# Patient Record
Sex: FEMALE | NOT HISPANIC OR LATINO | Employment: UNEMPLOYED
[De-identification: names, ages, dates, MRNs, and addresses within clinical notes are randomized per-mention and may not be internally consistent; named-entity substitution may affect disease eponyms.]

---
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
## 2023-07-14 ENCOUNTER — HOSPITAL ENCOUNTER (EMERGENCY)
Facility: CLINIC | Age: 23
Discharge: HOME | End: 2023-07-14
Attending: EMERGENCY MEDICINE | Admitting: EMERGENCY MEDICINE
Payer: COMMERCIAL

## 2023-07-14 VITALS
DIASTOLIC BLOOD PRESSURE: 74 MMHG | BODY MASS INDEX: 30.12 KG/M2 | RESPIRATION RATE: 16 BRPM | TEMPERATURE: 97.4 F | HEIGHT: 63 IN | WEIGHT: 170 LBS | OXYGEN SATURATION: 100 % | HEART RATE: 62 BPM | SYSTOLIC BLOOD PRESSURE: 128 MMHG

## 2023-07-14 DIAGNOSIS — O21.0 HYPEREMESIS GRAVIDARUM: ICD-10-CM

## 2023-07-14 LAB
ABO/RH(D): NORMAL
ALBUMIN SERPL-MCNC: 4.4 G/DL (ref 3.5–5)
ALBUMIN UR-MCNC: 50 MG/DL
ALP SERPL-CCNC: 38 U/L (ref 45–120)
ALT SERPL W P-5'-P-CCNC: 13 U/L (ref 0–45)
ANION GAP SERPL CALCULATED.3IONS-SCNC: 11 MMOL/L (ref 5–18)
APPEARANCE UR: CLEAR
AST SERPL W P-5'-P-CCNC: 14 U/L (ref 0–40)
BACTERIA #/AREA URNS HPF: ABNORMAL /HPF
BASOPHILS # BLD AUTO: 0 10E3/UL (ref 0–0.2)
BASOPHILS NFR BLD AUTO: 0 %
BILIRUB SERPL-MCNC: 0.6 MG/DL (ref 0–1)
BILIRUB UR QL STRIP: NEGATIVE
BUN SERPL-MCNC: 5 MG/DL (ref 8–22)
CALCIUM SERPL-MCNC: 9 MG/DL (ref 8.5–10.5)
CHLORIDE BLD-SCNC: 104 MMOL/L (ref 98–107)
CO2 SERPL-SCNC: 22 MMOL/L (ref 22–31)
COLOR UR AUTO: ABNORMAL
CREAT SERPL-MCNC: 0.6 MG/DL (ref 0.6–1.1)
EOSINOPHIL # BLD AUTO: 0 10E3/UL (ref 0–0.7)
EOSINOPHIL NFR BLD AUTO: 0 %
ERYTHROCYTE [DISTWIDTH] IN BLOOD BY AUTOMATED COUNT: 12.9 % (ref 10–15)
GFR SERPL CREATININE-BSD FRML MDRD: >90 ML/MIN/1.73M2
GLUCOSE BLD-MCNC: 135 MG/DL (ref 70–125)
GLUCOSE UR STRIP-MCNC: 500 MG/DL
HCG SERPL-ACNC: ABNORMAL MLU/ML (ref 0–4)
HCT VFR BLD AUTO: 35.5 % (ref 35–47)
HGB BLD-MCNC: 12.3 G/DL (ref 11.7–15.7)
HGB UR QL STRIP: NEGATIVE
HOLD SPECIMEN: NORMAL
HOLD SPECIMEN: NORMAL
IMM GRANULOCYTES # BLD: 0.1 10E3/UL
IMM GRANULOCYTES NFR BLD: 1 %
KETONES UR STRIP-MCNC: >150 MG/DL
LEUKOCYTE ESTERASE UR QL STRIP: NEGATIVE
LIPASE SERPL-CCNC: 13 U/L (ref 0–52)
LYMPHOCYTES # BLD AUTO: 1.2 10E3/UL (ref 0.8–5.3)
LYMPHOCYTES NFR BLD AUTO: 12 %
MCH RBC QN AUTO: 28.6 PG (ref 26.5–33)
MCHC RBC AUTO-ENTMCNC: 34.6 G/DL (ref 31.5–36.5)
MCV RBC AUTO: 83 FL (ref 78–100)
MONOCYTES # BLD AUTO: 0.3 10E3/UL (ref 0–1.3)
MONOCYTES NFR BLD AUTO: 3 %
MUCOUS THREADS #/AREA URNS LPF: PRESENT /LPF
NEUTROPHILS # BLD AUTO: 8.5 10E3/UL (ref 1.6–8.3)
NEUTROPHILS NFR BLD AUTO: 84 %
NITRATE UR QL: NEGATIVE
NRBC # BLD AUTO: 0 10E3/UL
NRBC BLD AUTO-RTO: 0 /100
PH UR STRIP: 7 [PH] (ref 5–7)
PLATELET # BLD AUTO: 258 10E3/UL (ref 150–450)
POTASSIUM BLD-SCNC: 2.9 MMOL/L (ref 3.5–5)
PROT SERPL-MCNC: 7.2 G/DL (ref 6–8)
RBC # BLD AUTO: 4.3 10E6/UL (ref 3.8–5.2)
RBC URINE: <1 /HPF
SODIUM SERPL-SCNC: 137 MMOL/L (ref 136–145)
SP GR UR STRIP: 1.03 (ref 1–1.03)
SPECIMEN EXPIRATION DATE: NORMAL
SQUAMOUS EPITHELIAL: 2 /HPF
UROBILINOGEN UR STRIP-MCNC: <2 MG/DL
WBC # BLD AUTO: 10 10E3/UL (ref 4–11)
WBC URINE: 2 /HPF

## 2023-07-14 PROCEDURE — 81001 URINALYSIS AUTO W/SCOPE: CPT | Performed by: EMERGENCY MEDICINE

## 2023-07-14 PROCEDURE — 99284 EMERGENCY DEPT VISIT MOD MDM: CPT | Mod: 25

## 2023-07-14 PROCEDURE — 96366 THER/PROPH/DIAG IV INF ADDON: CPT

## 2023-07-14 PROCEDURE — 80053 COMPREHEN METABOLIC PANEL: CPT | Performed by: EMERGENCY MEDICINE

## 2023-07-14 PROCEDURE — 96368 THER/DIAG CONCURRENT INF: CPT

## 2023-07-14 PROCEDURE — 85025 COMPLETE CBC W/AUTO DIFF WBC: CPT | Performed by: EMERGENCY MEDICINE

## 2023-07-14 PROCEDURE — 36415 COLL VENOUS BLD VENIPUNCTURE: CPT | Performed by: EMERGENCY MEDICINE

## 2023-07-14 PROCEDURE — 250N000011 HC RX IP 250 OP 636: Mod: JZ | Performed by: EMERGENCY MEDICINE

## 2023-07-14 PROCEDURE — 86901 BLOOD TYPING SEROLOGIC RH(D): CPT | Performed by: EMERGENCY MEDICINE

## 2023-07-14 PROCEDURE — 96365 THER/PROPH/DIAG IV INF INIT: CPT

## 2023-07-14 PROCEDURE — 83690 ASSAY OF LIPASE: CPT | Performed by: EMERGENCY MEDICINE

## 2023-07-14 PROCEDURE — 84702 CHORIONIC GONADOTROPIN TEST: CPT | Performed by: EMERGENCY MEDICINE

## 2023-07-14 PROCEDURE — 96375 TX/PRO/DX INJ NEW DRUG ADDON: CPT

## 2023-07-14 RX ORDER — ONDANSETRON 2 MG/ML
4 INJECTION INTRAMUSCULAR; INTRAVENOUS ONCE
Status: COMPLETED | OUTPATIENT
Start: 2023-07-14 | End: 2023-07-14

## 2023-07-14 RX ORDER — PRENATAL VIT/IRON FUM/FOLIC AC 27MG-0.8MG
1 TABLET ORAL DAILY
Qty: 90 TABLET | Refills: 3 | Status: SHIPPED | OUTPATIENT
Start: 2023-07-14

## 2023-07-14 RX ORDER — POTASSIUM CHLORIDE 7.45 MG/ML
10 INJECTION INTRAVENOUS ONCE
Status: COMPLETED | OUTPATIENT
Start: 2023-07-14 | End: 2023-07-14

## 2023-07-14 RX ORDER — POTASSIUM CHLORIDE 1500 MG/1
20 TABLET, EXTENDED RELEASE ORAL 2 TIMES DAILY
Qty: 20 TABLET | Refills: 0 | Status: SHIPPED | OUTPATIENT
Start: 2023-07-14

## 2023-07-14 RX ORDER — METOCLOPRAMIDE HYDROCHLORIDE 5 MG/ML
10 INJECTION INTRAMUSCULAR; INTRAVENOUS ONCE
Status: COMPLETED | OUTPATIENT
Start: 2023-07-14 | End: 2023-07-14

## 2023-07-14 RX ORDER — DIPHENHYDRAMINE HYDROCHLORIDE 50 MG/ML
25 INJECTION INTRAMUSCULAR; INTRAVENOUS ONCE
Status: COMPLETED | OUTPATIENT
Start: 2023-07-14 | End: 2023-07-14

## 2023-07-14 RX ORDER — METOCLOPRAMIDE 10 MG/1
10 TABLET ORAL
Qty: 60 TABLET | Refills: 0 | Status: SHIPPED | OUTPATIENT
Start: 2023-07-14

## 2023-07-14 RX ADMIN — DEXTROSE AND SODIUM CHLORIDE: 5; 450 INJECTION, SOLUTION INTRAVENOUS at 08:31

## 2023-07-14 RX ADMIN — METOCLOPRAMIDE HYDROCHLORIDE 10 MG: 5 INJECTION INTRAMUSCULAR; INTRAVENOUS at 08:32

## 2023-07-14 RX ADMIN — ONDANSETRON 4 MG: 2 INJECTION INTRAMUSCULAR; INTRAVENOUS at 12:46

## 2023-07-14 RX ADMIN — POTASSIUM CHLORIDE 10 MEQ: 7.46 INJECTION, SOLUTION INTRAVENOUS at 10:04

## 2023-07-14 RX ADMIN — DIPHENHYDRAMINE HYDROCHLORIDE 25 MG: 50 INJECTION INTRAMUSCULAR; INTRAVENOUS at 08:32

## 2023-07-14 ASSESSMENT — ENCOUNTER SYMPTOMS
VOMITING: 1
NAUSEA: 1
ABDOMINAL PAIN: 0

## 2023-07-14 ASSESSMENT — ACTIVITIES OF DAILY LIVING (ADL)
ADLS_ACUITY_SCORE: 35

## 2023-07-14 NOTE — DISCHARGE INSTRUCTIONS
Return as needed if you are not able to keep hydrated.  Your next dose of the nausea medication can be anytime after 4 PM.

## 2023-07-14 NOTE — ED PROVIDER NOTES
Emergency Department Encounter     Evaluation Date & Time:   No admission date for patient encounter.    CHIEF COMPLAINT:  Nausea & Vomiting      Triage Note: Patient presents to the ED with complaints of worsening nausea and vomiting over the last several weeks. She states she is about 9 wks pregnant.    FINAL IMPRESSION:    ICD-10-CM    1. Hyperemesis gravidarum  O21.0           Impression and Plan     ED COURSE & MEDICAL DECISION MAKIN:01 AM I met with the patient to gather history and to perform my initial exam. We discussed plans for the ED course, including diagnostic testing and treatment.   9:34 AM I rechecked and updated the patient with laboratory results.    ED Course as of 23 1321      0808 She is a  @ 7 wks and 3d ega presuming her pregnancy test was accurate and she is pregnant.  Presuming she is pregnant then persistent nausea vomiting can be expected in the first trimester.  She has had the discussion with her OB/GYN already on her last pregnancy about risk and benefit of doing antinausea medications.  At this point she is vomiting so much that certainly there is concern that she is not getting enough nutrients in which would be unhealthy for the child developing and she would like to do some antinausea medications.  So, I have ordered Reglan for her here.  If that works well for her that may be an option for her at home.  Otherwise, with recurrent nausea vomiting we will give her some dextrose in her fluids.  We will get baseline lab work including a hormone level in case her symptoms worsen.  Otherwise differential certainly would include possible other cause of nausea vomiting such as cholecystitis, cholestasis, gastritis, appendicitis or even gastroenteritis.  However, as she has no complaint of associated abdominal pains these other surgical or infectious causes are less likely.  UTI also is less likely as she is asymptomatic for it but as it is important to  address early in pregnancy we will attempt to obtain a urine sample.   0938 Ua obtained after dextrose infusion started, but she does have ketones c/w starvation ketosis so dextrose should be helpful to her to clear these ketones.  She is a bit low on potassium as well which is again likely due to gi losses and poor oral intake.  Will start with iv replace.   0942 She is otherwise feeling improved.  Resting.  Liver function tests show no signs of obstruction and wbc appears good.   1320 No emesis while here, but she does continue to have some nausea although it is obviously improved.  It looks like previously she did well on Reglan so I have written a prescription for that for her at home, started prenatal vitamins, as well as potassium.  She has follow up already set within the week.  Will return as needed if worsens again.       At the conclusion of the encounter I discussed the results of all the tests and the disposition. The questions were answered. The patient or family acknowledged understanding and was agreeable with the care plan.          0 minutes of critical care time        MEDICATIONS GIVEN IN THE EMERGENCY DEPARTMENT:  Medications   dextrose 5% and 0.45% NaCl infusion (0 mLs Intravenous Stopped 7/14/23 1042)   metoclopramide (REGLAN) injection 10 mg (10 mg Intravenous $Given 7/14/23 0832)   diphenhydrAMINE (BENADRYL) injection 25 mg (25 mg Intravenous $Given 7/14/23 0832)   potassium chloride 10 mEq in 100 mL sterile water infusion (0 mEq Intravenous Stopped 7/14/23 1121)   ondansetron (ZOFRAN) injection 4 mg (4 mg Intravenous $Given 7/14/23 1246)       NEW PRESCRIPTIONS STARTED AT TODAY'S ED VISIT:  New Prescriptions    METOCLOPRAMIDE (REGLAN) 10 MG TABLET    Take 1 tablet (10 mg) by mouth 4 times daily (before meals and nightly)    POTASSIUM CHLORIDE ER (KLOR-CON M) 20 MEQ CR TABLET    Take 1 tablet (20 mEq) by mouth 2 times daily    PRENATAL VIT-FE FUMARATE-FA (PRENATAL MULTIVITAMIN W/IRON) 27-0.8  "MG TABLET    Take 1 tablet by mouth daily       HPI     The history is provided by the patient. No  was used.        Danielle Johnson is a 23 year old female with no pertinent history who presents to this ED private car for evaluation of nausea and vomiting.    Patient reports persistent nausea and vomiting, with slight streaks of blood for the past several weeks. Patient last had her menstrual period on 2023 and is 7 weeks and 3 days pregnant, per at home pregnancy test. She has not yet been evaluated by her OBGYN. Patient is . She does not nausea and vomiting with her last pregnancy. Patient had the discussion of anti-nausea medication with her OBGYN, but opted against taking it. She reports no issues with her previous pregnancy and states the baby was born at full term.     Patient denies any significant past medical history. No vaginal bleeding or abdominal pain. No other medical concerns are expressed at this time.     REVIEW OF SYSTEMS:  Review of Systems   Gastrointestinal: Positive for nausea and vomiting. Negative for abdominal pain.   Genitourinary: Negative for vaginal bleeding.     remainder of systems are all otherwise negative.        Medical History     History reviewed. No pertinent past medical history.    History reviewed. No pertinent surgical history.    History reviewed. No pertinent family history.         metoclopramide (REGLAN) 10 MG tablet  potassium chloride ER (KLOR-CON M) 20 MEQ CR tablet  Prenatal Vit-Fe Fumarate-FA (PRENATAL MULTIVITAMIN W/IRON) 27-0.8 MG tablet        Physical Exam     First Vitals:  Patient Vitals for the past 24 hrs:   BP Temp Temp src Pulse Resp SpO2 Height Weight   23 0900 138/73 -- -- 56 -- 100 % -- --   23 0850 -- -- -- 59 -- 100 % -- --   23 0848 136/72 -- -- 63 16 99 % -- --   23 0758 (!) 150/80 97.6  F (36.4  C) Oral 54 18 99 % 1.6 m (5' 3\") 77.1 kg (170 lb)       PHYSICAL EXAM:   Constitutional:   Seen in " triage bay sitting up and fully clothed.   HENT:  Normocephalic, posterior pharynx wnl, mucous membranes moist and moderately pink   Eyes:  PERRL, EOMI, Conjunctiva normal, No discharge, no scleral icterus.  Respiratory:  Breathing easily, lungs clear to auscultation    Cardiovascular:  Regular rate and rhythm, nl s1s2 0 murmurs, rubs, or gallops.  Peripheral pulses dp, pt, and radial are wnl.  No peripheral edema   GI:  Bowel sounds normal, Soft, No tenderness, No flank tenderness, nondistended.  :No CVA tenderness.   Musculoskeletal:  Moves all extremities.  No erythematous or swollen major joints,   Integument:  Normal color  Lymphatic:  No cervical lymphadenopathy  Neurologic:  Alert & oriented x 3, Normal motor function, Normal sensory function, No focal deficits noted. Normal speech.  Psychiatric:  Affect normal, Judgment normal, Mood normal.     Results     LAB AND RADIOLOGY:  All pertinent labs reviewed and interpreted  Results for orders placed or performed during the hospital encounter of 07/14/23   Comprehensive metabolic panel     Status: Abnormal   Result Value Ref Range    Sodium 137 136 - 145 mmol/L    Potassium 2.9 (L) 3.5 - 5.0 mmol/L    Chloride 104 98 - 107 mmol/L    Carbon Dioxide (CO2) 22 22 - 31 mmol/L    Anion Gap 11 5 - 18 mmol/L    Urea Nitrogen 5 (L) 8 - 22 mg/dL    Creatinine 0.60 0.60 - 1.10 mg/dL    Calcium 9.0 8.5 - 10.5 mg/dL    Glucose 135 (H) 70 - 125 mg/dL    Alkaline Phosphatase 38 (L) 45 - 120 U/L    AST 14 0 - 40 U/L    ALT 13 0 - 45 U/L    Protein Total 7.2 6.0 - 8.0 g/dL    Albumin 4.4 3.5 - 5.0 g/dL    Bilirubin Total 0.6 0.0 - 1.0 mg/dL    GFR Estimate >90 >60 mL/min/1.73m2   Lipase     Status: Normal   Result Value Ref Range    Lipase 13 0 - 52 U/L   HCG quantitative pregnancy (blood)     Status: Abnormal   Result Value Ref Range    hCG Quantitative 112,954 (H) 0 - 4 mlU/mL   UA with Microscopic     Status: Abnormal   Result Value Ref Range    Color Urine Light Yellow  Colorless, Straw, Light Yellow, Yellow    Appearance Urine Clear Clear    Glucose Urine 500 (A) Negative mg/dL    Bilirubin Urine Negative Negative    Ketones Urine >150 (A) Negative mg/dL    Specific Gravity Urine 1.026 1.001 - 1.030    Blood Urine Negative Negative    pH Urine 7.0 5.0 - 7.0    Protein Albumin Urine 50 (A) Negative mg/dL    Urobilinogen Urine <2.0 <2.0 mg/dL    Nitrite Urine Negative Negative    Leukocyte Esterase Urine Negative Negative    Bacteria Urine Few (A) None Seen /HPF    Mucus Urine Present (A) None Seen /LPF    RBC Urine <1 <=2 /HPF    WBC Urine 2 <=5 /HPF    Squamous Epithelials Urine 2 (H) <=1 /HPF   CBC with platelets and differential     Status: Abnormal   Result Value Ref Range    WBC Count 10.0 4.0 - 11.0 10e3/uL    RBC Count 4.30 3.80 - 5.20 10e6/uL    Hemoglobin 12.3 11.7 - 15.7 g/dL    Hematocrit 35.5 35.0 - 47.0 %    MCV 83 78 - 100 fL    MCH 28.6 26.5 - 33.0 pg    MCHC 34.6 31.5 - 36.5 g/dL    RDW 12.9 10.0 - 15.0 %    Platelet Count 258 150 - 450 10e3/uL    % Neutrophils 84 %    % Lymphocytes 12 %    % Monocytes 3 %    % Eosinophils 0 %    % Basophils 0 %    % Immature Granulocytes 1 %    NRBCs per 100 WBC 0 <1 /100    Absolute Neutrophils 8.5 (H) 1.6 - 8.3 10e3/uL    Absolute Lymphocytes 1.2 0.8 - 5.3 10e3/uL    Absolute Monocytes 0.3 0.0 - 1.3 10e3/uL    Absolute Eosinophils 0.0 0.0 - 0.7 10e3/uL    Absolute Basophils 0.0 0.0 - 0.2 10e3/uL    Absolute Immature Granulocytes 0.1 <=0.4 10e3/uL    Absolute NRBCs 0.0 10e3/uL   Extra Tube     Status: None    Narrative    The following orders were created for panel order Extra Tube.  Procedure                               Abnormality         Status                     ---------                               -----------         ------                     Extra Blue Top Tube[[id number]]                              Final result               Extra Red Top Tube[[id number]]                               Final result                  Please view results for these tests on the individual orders.   Extra Blue Top Tube     Status: None   Result Value Ref Range    Hold Specimen JIC    Extra Red Top Tube     Status: None   Result Value Ref Range    Hold Specimen JIC    ABO and Rh     Status: None   Result Value Ref Range    ABO/RH(D) A POS     SPECIMEN EXPIRATION DATE [id number]    CBC with platelets differential     Status: Abnormal    Narrative    The following orders were created for panel order CBC with platelets differential.  Procedure                               Abnormality         Status                     ---------                               -----------         ------                     CBC with platelets and d...[[id number]]  Abnormal            Final result                 Please view results for these tests on the individual orders.         [facility] Documentation     Medical Decision Making    History:    Supplemental history from: Documented in chart, if applicable    External Record(s) reviewed: Documented in chart, if applicable.    Work Up:    Chart documentation includes differential considered and any EKGs or imaging independently interpreted by provider, where specified.    In additional to work up documented, I considered the following work up: Documented in chart, if applicable.    External consultation:    Discussion of management with another provider: Documented in chart, if applicable    Complicating factors:    Care impacted by chronic illness: Other: pregnancy state and hx/o nausea/vomiting with previous pregnancies    Care affected by social determinants of health: Other: has not yet had her first appt.    Disposition considerations: Discharge. I prescribed additional prescription strength medication(s) as charted. See documentation for any additional details.      The creation of this record is based on the scribe s observations of the work being performed by Yvonne Plunkett and the provider s  statements to them. This document has been checked and approved by MD Stephanie Smith MD  Emergency Medicine  [facility] EMERGENCY ROOM         Stephanie Barajas MD  07/14/23 [id number]

## 2023-07-14 NOTE — ED TRIAGE NOTES
Patient presents to the ED with complaints of worsening nausea and vomiting over the last several weeks. She states she is about 9 wks pregnant.

## 2023-07-15 ENCOUNTER — HOSPITAL ENCOUNTER (EMERGENCY)
Facility: CLINIC | Age: 23
Discharge: HOME | End: 2023-07-15
Attending: EMERGENCY MEDICINE | Admitting: EMERGENCY MEDICINE
Payer: COMMERCIAL

## 2023-07-15 VITALS
HEART RATE: 56 BPM | BODY MASS INDEX: 30.12 KG/M2 | RESPIRATION RATE: 20 BRPM | TEMPERATURE: 97.5 F | DIASTOLIC BLOOD PRESSURE: 83 MMHG | WEIGHT: 170 LBS | SYSTOLIC BLOOD PRESSURE: 157 MMHG | OXYGEN SATURATION: 96 % | HEIGHT: 63 IN

## 2023-07-15 DIAGNOSIS — O21.9 NAUSEA AND VOMITING IN PREGNANCY: ICD-10-CM

## 2023-07-15 LAB
ALBUMIN SERPL-MCNC: 4.2 G/DL (ref 3.5–5)
ALBUMIN UR-MCNC: 20 MG/DL
ALP SERPL-CCNC: 34 U/L (ref 45–120)
ALT SERPL W P-5'-P-CCNC: 11 U/L (ref 0–45)
ANION GAP SERPL CALCULATED.3IONS-SCNC: 11 MMOL/L (ref 5–18)
APPEARANCE UR: CLEAR
AST SERPL W P-5'-P-CCNC: 15 U/L (ref 0–40)
BASOPHILS # BLD AUTO: 0 10E3/UL (ref 0–0.2)
BASOPHILS NFR BLD AUTO: 0 %
BILIRUB SERPL-MCNC: 0.8 MG/DL (ref 0–1)
BILIRUB UR QL STRIP: NEGATIVE
BUN SERPL-MCNC: 5 MG/DL (ref 8–22)
CALCIUM SERPL-MCNC: 9.1 MG/DL (ref 8.5–10.5)
CHLORIDE BLD-SCNC: 106 MMOL/L (ref 98–107)
CO2 SERPL-SCNC: 20 MMOL/L (ref 22–31)
COLOR UR AUTO: ABNORMAL
CREAT SERPL-MCNC: 0.57 MG/DL (ref 0.6–1.1)
EOSINOPHIL # BLD AUTO: 0 10E3/UL (ref 0–0.7)
EOSINOPHIL NFR BLD AUTO: 0 %
ERYTHROCYTE [DISTWIDTH] IN BLOOD BY AUTOMATED COUNT: 12.9 % (ref 10–15)
GFR SERPL CREATININE-BSD FRML MDRD: >90 ML/MIN/1.73M2
GLUCOSE BLD-MCNC: 125 MG/DL (ref 70–125)
GLUCOSE UR STRIP-MCNC: 70 MG/DL
HCG SERPL QL: POSITIVE
HCT VFR BLD AUTO: 34.7 % (ref 35–47)
HGB BLD-MCNC: 12 G/DL (ref 11.7–15.7)
HGB UR QL STRIP: NEGATIVE
IMM GRANULOCYTES # BLD: 0.1 10E3/UL
IMM GRANULOCYTES NFR BLD: 1 %
KETONES UR STRIP-MCNC: >150 MG/DL
LACTATE SERPL-SCNC: 1.3 MMOL/L (ref 0.7–2)
LEUKOCYTE ESTERASE UR QL STRIP: NEGATIVE
LIPASE SERPL-CCNC: 14 U/L (ref 0–52)
LYMPHOCYTES # BLD AUTO: 1.4 10E3/UL (ref 0.8–5.3)
LYMPHOCYTES NFR BLD AUTO: 14 %
MCH RBC QN AUTO: 28.6 PG (ref 26.5–33)
MCHC RBC AUTO-ENTMCNC: 34.6 G/DL (ref 31.5–36.5)
MCV RBC AUTO: 83 FL (ref 78–100)
MONOCYTES # BLD AUTO: 0.4 10E3/UL (ref 0–1.3)
MONOCYTES NFR BLD AUTO: 4 %
MUCOUS THREADS #/AREA URNS LPF: PRESENT /LPF
NEUTROPHILS # BLD AUTO: 8.4 10E3/UL (ref 1.6–8.3)
NEUTROPHILS NFR BLD AUTO: 81 %
NITRATE UR QL: NEGATIVE
NRBC # BLD AUTO: 0 10E3/UL
NRBC BLD AUTO-RTO: 0 /100
PH UR STRIP: 6.5 [PH] (ref 5–7)
PLATELET # BLD AUTO: 259 10E3/UL (ref 150–450)
POTASSIUM BLD-SCNC: 3 MMOL/L (ref 3.5–5)
PROT SERPL-MCNC: 7.1 G/DL (ref 6–8)
RBC # BLD AUTO: 4.19 10E6/UL (ref 3.8–5.2)
RBC URINE: <1 /HPF
SODIUM SERPL-SCNC: 137 MMOL/L (ref 136–145)
SP GR UR STRIP: 1.02 (ref 1–1.03)
SQUAMOUS EPITHELIAL: 3 /HPF
UROBILINOGEN UR STRIP-MCNC: <2 MG/DL
WBC # BLD AUTO: 10.2 10E3/UL (ref 4–11)
WBC URINE: 3 /HPF

## 2023-07-15 PROCEDURE — 84703 CHORIONIC GONADOTROPIN ASSAY: CPT | Performed by: EMERGENCY MEDICINE

## 2023-07-15 PROCEDURE — 81001 URINALYSIS AUTO W/SCOPE: CPT | Performed by: EMERGENCY MEDICINE

## 2023-07-15 PROCEDURE — 96374 THER/PROPH/DIAG INJ IV PUSH: CPT | Mod: 59

## 2023-07-15 PROCEDURE — 250N000013 HC RX MED GY IP 250 OP 250 PS 637: Performed by: EMERGENCY MEDICINE

## 2023-07-15 PROCEDURE — 36415 COLL VENOUS BLD VENIPUNCTURE: CPT | Performed by: EMERGENCY MEDICINE

## 2023-07-15 PROCEDURE — 80053 COMPREHEN METABOLIC PANEL: CPT | Performed by: EMERGENCY MEDICINE

## 2023-07-15 PROCEDURE — 250N000011 HC RX IP 250 OP 636: Mod: JZ | Performed by: EMERGENCY MEDICINE

## 2023-07-15 PROCEDURE — 96375 TX/PRO/DX INJ NEW DRUG ADDON: CPT

## 2023-07-15 PROCEDURE — 99284 EMERGENCY DEPT VISIT MOD MDM: CPT | Mod: 25

## 2023-07-15 PROCEDURE — 83605 ASSAY OF LACTIC ACID: CPT | Performed by: EMERGENCY MEDICINE

## 2023-07-15 PROCEDURE — 258N000003 HC RX IP 258 OP 636: Performed by: EMERGENCY MEDICINE

## 2023-07-15 PROCEDURE — 96361 HYDRATE IV INFUSION ADD-ON: CPT

## 2023-07-15 PROCEDURE — 85025 COMPLETE CBC W/AUTO DIFF WBC: CPT | Performed by: EMERGENCY MEDICINE

## 2023-07-15 PROCEDURE — 83690 ASSAY OF LIPASE: CPT | Performed by: EMERGENCY MEDICINE

## 2023-07-15 RX ORDER — DIMENHYDRINATE 50 MG/ML
50 INJECTION, SOLUTION INTRAMUSCULAR; INTRAVENOUS ONCE
Status: COMPLETED | OUTPATIENT
Start: 2023-07-15 | End: 2023-07-15

## 2023-07-15 RX ORDER — DIMENHYDRINATE 50 MG
50 TABLET ORAL EVERY 4 HOURS PRN
Qty: 30 TABLET | Refills: 0 | Status: SHIPPED | OUTPATIENT
Start: 2023-07-15

## 2023-07-15 RX ORDER — ONDANSETRON 2 MG/ML
4 INJECTION INTRAMUSCULAR; INTRAVENOUS ONCE
Status: COMPLETED | OUTPATIENT
Start: 2023-07-15 | End: 2023-07-15

## 2023-07-15 RX ORDER — ONDANSETRON 4 MG/1
4 TABLET, ORALLY DISINTEGRATING ORAL EVERY 8 HOURS PRN
Qty: 20 TABLET | Refills: 0 | Status: SHIPPED | OUTPATIENT
Start: 2023-07-15

## 2023-07-15 RX ORDER — POTASSIUM CHLORIDE 1500 MG/1
40 TABLET, EXTENDED RELEASE ORAL ONCE
Status: COMPLETED | OUTPATIENT
Start: 2023-07-15 | End: 2023-07-15

## 2023-07-15 RX ADMIN — SODIUM CHLORIDE 1000 ML: 9 INJECTION, SOLUTION INTRAVENOUS at 04:32

## 2023-07-15 RX ADMIN — ONDANSETRON 4 MG: 2 INJECTION INTRAMUSCULAR; INTRAVENOUS at 04:32

## 2023-07-15 RX ADMIN — DIMENHYDRINATE 50 MG: 50 INJECTION, SOLUTION INTRAMUSCULAR; INTRAVENOUS at 05:25

## 2023-07-15 RX ADMIN — POTASSIUM CHLORIDE 40 MEQ: 1500 TABLET, EXTENDED RELEASE ORAL at 05:25

## 2023-07-15 ASSESSMENT — ACTIVITIES OF DAILY LIVING (ADL): ADLS_ACUITY_SCORE: 35

## 2023-07-15 NOTE — ED TRIAGE NOTES
Patient presents to the ED complaining of nausea and vomiting for the past month, she reports being 8-9 weeks pregnant and states she was like this with her last pregnancy.  Denies pain.       Triage Assessment     Row Name 07/15/23 0421       Triage Assessment (Adult)    Airway WDL WDL       Respiratory WDL    Respiratory WDL WDL       Skin Circulation/Temperature WDL    Skin Circulation/Temperature WDL WDL       Cardiac WDL    Cardiac WDL WDL       Peripheral/Neurovascular WDL    Peripheral Neurovascular WDL WDL       Cognitive/Neuro/Behavioral WDL    Cognitive/Neuro/Behavioral WDL WDL

## 2023-07-15 NOTE — DISCHARGE INSTRUCTIONS
Clear liquid diet if having nausea and vomiting  Continue the Reglan and potassium as previously prescribed for nausea or vomiting  Please follow-up with your OB/GYN on Monday or Tuesday for recheck

## 2023-07-15 NOTE — ED PROVIDER NOTES
EMERGENCY DEPARTMENT ENCOUnter      NAME: Danielle Johnson  AGE: 23 year old female  YOB: 2000  MRN: [id number]  EVALUATION DATE & TIME: 7/15/2023  4:37 AM    PCP: [facility]    ED PROVIDER: Asia Tamayo MD      Chief Complaint   Patient presents with     Nausea & Vomiting     One month, pregnant 8 weeks         FINAL IMPRESSION:  1. Nausea and vomiting in pregnancy          ED COURSE & MEDICAL DECISION MAKING:      In summary, the patient is a 23-year-old female that presents to the emergency department for evaluation of nausea and vomiting in pregnancy.  She has no evidence of urinary tract infection, appendicitis, or more serious etiologies of nausea and vomiting.  We will treat her symptomatically with close outpatient follow-up with her OB.    0500-evaluated patient.  Normal saline 1 L IV was administered for IV hydration.  Zofran 4 mg IV was administered for nausea and vomiting.  ReEvaluation reveals that the patient had mild relief of her nausea and vomiting with the Zofran.  Dramamine 50 mg IV was administered for additional nausea control.  Potassium 40 mill equivalents p.o. was administered for her low potassium level.    Medical Decision Making    History:    Supplemental history from: Documented in chart, if applicable    External Record(s) reviewed: Documented in chart, if applicable.    Work Up:    Chart documentation includes differential considered and any EKGs or imaging independently interpreted by provider, where specified.    In additional to work up documented, I considered the following work up: Documented in chart, if applicable.    External consultation:    Discussion of management with another provider: Documented in chart, if applicable    Complicating factors:    Care impacted by chronic illness: N/A    Care affected by social determinants of health: N/A    Disposition considerations: Discharge. I prescribed additional prescription strength  medication(s) as charted. See documentation for any additional details.          At the conclusion of the encounter I discussed the results of all of the tests and the disposition. The questions were answered. The patient or family acknowledged understanding and was agreeable with the care plan.         MEDICATIONS GIVEN IN THE EMERGENCY:  Medications   0.9% sodium chloride BOLUS (0 mLs Intravenous Stopped 7/15/23 0525)   ondansetron (ZOFRAN) injection 4 mg (4 mg Intravenous $Given 7/15/23 0432)   dimenhyDRINATE (DRAMAMINE) injection 50 mg (50 mg Intravenous $Given 7/15/23 0525)   potassium chloride ER (KLOR-CON M) CR tablet 40 mEq (40 mEq Oral $Given 7/15/23 0525)       NEW PRESCRIPTIONS STARTED AT TODAY'S ER VISIT  Discharge Medication List as of 7/15/2023  6:45 AM      START taking these medications    Details   dimenhyDRINATE (DRAMAMINE) 50 MG tablet Take 1 tablet (50 mg) by mouth every 4 hours as needed for other (nausea or vomiting), Disp-30 tablet, R-0, Local Print      fa-pyridoxine-cyancobalamin 2.5-25-2 MG TABS per tablet Take 1 tablet by mouth daily, Disp-30 tablet, R-0, Local Print      ondansetron (ZOFRAN ODT) 4 MG ODT tab Take 1 tablet (4 mg) by mouth every 8 hours as needed for nausea or vomiting, Disp-20 tablet, R-0, Local Print                =================================================================    HPI        Danielle Johnson is a 23 year old female with a pertinent history of pregnancy presents to the emergency department for evaluation of nausea and vomiting in pregnancy.  The patient states that she is vomiting several times a day and has not been able to tolerate anything by mouth.  She denies any abdominal pain, fevers, chills, dysuria, urgency or frequency.  She was prescribed metoclopramide which she states she has not been able to take due to her vomiting.      REVIEW OF SYSTEMS     Constitutional:  Denies fever or chills  HENT:  Denies sore throat   Respiratory:  Denies cough or  shortness of breath   Cardiovascular:  Denies chest pain or palpitations  GI:  Nausea and vomiting  Musculoskeletal:  Denies any new extremity pain   Skin:  Denies rash   Neurologic:  Denies headache, focal weakness or sensory changes    All other systems reviewed and are negative      PAST MEDICAL HISTORY:  Pregnant 8-9 weeks        CURRENT MEDICATIONS:    dimenhyDRINATE (DRAMAMINE) 50 MG tablet  fa-pyridoxine-cyancobalamin 2.5-25-2 MG TABS per tablet  ondansetron (ZOFRAN ODT) 4 MG ODT tab  metoclopramide (REGLAN) 10 MG tablet  potassium chloride ER (KLOR-CON M) 20 MEQ CR tablet  Prenatal Vit-Fe Fumarate-FA (PRENATAL MULTIVITAMIN W/IRON) 27-0.8 MG tablet        ALLERGIES:  No Known Allergies    FAMILY HISTORY:  No family history on file.    SOCIAL HISTORY:   Social History     Socioeconomic History     Marital status: Single       PHYSICAL EXAM    Constitutional:  Well developed, Well nourished,  HENT:  Normocephalic, Atraumatic, Bilateral external ears normal, Oropharynx moist, Nose normal.   Neck:  Normal range of motion, No meningismus, No stridor.   Eyes:  EOMI, Conjunctiva normal, No discharge.   Respiratory:  Normal breath sounds, No respiratory distress, No wheezing, No chest tenderness.   Cardiovascular:  Normal heart rate, Normal rhythm, No murmurs  GI:  Soft, No tenderness, No guarding, No CVA tenderness.   Musculoskeletal:  Neurovascularly intact distally, No edema, No tenderness, No cyanosis, Good range of motion in all major joints.  Integument:  Warm, Dry, No erythema, No rash.   Lymphatic:  No lymphadenopathy noted.   Neurologic:  Alert & oriented , Normal motor function,  No focal deficits noted.   Psychiatric:  Affect normal, Judgment normal, Mood normal.      LAB:  All pertinent labs reviewed and interpreted.  Results for orders placed or performed during the hospital encounter of 07/15/23   Comprehensive metabolic panel   Result Value Ref Range    Sodium 137 136 - 145 mmol/L    Potassium 3.0 (L)  3.5 - 5.0 mmol/L    Chloride 106 98 - 107 mmol/L    Carbon Dioxide (CO2) 20 (L) 22 - 31 mmol/L    Anion Gap 11 5 - 18 mmol/L    Urea Nitrogen 5 (L) 8 - 22 mg/dL    Creatinine 0.57 (L) 0.60 - 1.10 mg/dL    Calcium 9.1 8.5 - 10.5 mg/dL    Glucose 125 70 - 125 mg/dL    Alkaline Phosphatase 34 (L) 45 - 120 U/L    AST 15 0 - 40 U/L    ALT 11 0 - 45 U/L    Protein Total 7.1 6.0 - 8.0 g/dL    Albumin 4.2 3.5 - 5.0 g/dL    Bilirubin Total 0.8 0.0 - 1.0 mg/dL    GFR Estimate >90 >60 mL/min/1.73m2   Result Value Ref Range    Lipase 14 0 - 52 U/L   Lactic acid whole blood   Result Value Ref Range    Lactic Acid 1.3 0.7 - 2.0 mmol/L   HCG QUALitative pregnancy (blood)   Result Value Ref Range    hCG Serum Qualitative Positive (A) Negative   UA with Microscopic reflex to Culture    Specimen: Urine, Clean Catch   Result Value Ref Range    Color Urine Light Yellow Colorless, Straw, Light Yellow, Yellow    Appearance Urine Clear Clear    Glucose Urine 70 (A) Negative mg/dL    Bilirubin Urine Negative Negative    Ketones Urine >150 (A) Negative mg/dL    Specific Gravity Urine 1.022 1.001 - 1.030    Blood Urine Negative Negative    pH Urine 6.5 5.0 - 7.0    Protein Albumin Urine 20 (A) Negative mg/dL    Urobilinogen Urine <2.0 <2.0 mg/dL    Nitrite Urine Negative Negative    Leukocyte Esterase Urine Negative Negative    Mucus Urine Present (A) None Seen /LPF    RBC Urine <1 <=2 /HPF    WBC Urine 3 <=5 /HPF    Squamous Epithelials Urine 3 (H) <=1 /HPF   CBC with platelets and differential   Result Value Ref Range    WBC Count 10.2 4.0 - 11.0 10e3/uL    RBC Count 4.19 3.80 - 5.20 10e6/uL    Hemoglobin 12.0 11.7 - 15.7 g/dL    Hematocrit 34.7 (L) 35.0 - 47.0 %    MCV 83 78 - 100 fL    MCH 28.6 26.5 - 33.0 pg    MCHC 34.6 31.5 - 36.5 g/dL    RDW 12.9 10.0 - 15.0 %    Platelet Count 259 150 - 450 10e3/uL    % Neutrophils 81 %    % Lymphocytes 14 %    % Monocytes 4 %    % Eosinophils 0 %    % Basophils 0 %    % Immature Granulocytes 1 %     NRBCs per 100 WBC 0 <1 /100    Absolute Neutrophils 8.4 (H) 1.6 - 8.3 10e3/uL    Absolute Lymphocytes 1.4 0.8 - 5.3 10e3/uL    Absolute Monocytes 0.4 0.0 - 1.3 10e3/uL    Absolute Eosinophils 0.0 0.0 - 0.7 10e3/uL    Absolute Basophils 0.0 0.0 - 0.2 10e3/uL    Absolute Immature Granulocytes 0.1 <=0.4 10e3/uL    Absolute NRBCs 0.0 10e3/uL                   Asia Tamayo MD  Emergency Medicine  [facility] EMERGENCY ROOM  [facility] [facility] [address] [phone]  Dept: [phone]       Asia Tamayo MD  07/19/23 8295

## 2023-07-27 ENCOUNTER — HOSPITAL ENCOUNTER (EMERGENCY)
Facility: CLINIC | Age: 23
Discharge: HOME | End: 2023-07-27
Admitting: PHYSICIAN ASSISTANT
Payer: COMMERCIAL

## 2023-07-27 VITALS
TEMPERATURE: 97.8 F | OXYGEN SATURATION: 98 % | SYSTOLIC BLOOD PRESSURE: 142 MMHG | BODY MASS INDEX: 29.7 KG/M2 | WEIGHT: 165 LBS | RESPIRATION RATE: 18 BRPM | HEART RATE: 56 BPM | DIASTOLIC BLOOD PRESSURE: 72 MMHG

## 2023-07-27 DIAGNOSIS — O21.0 HYPEREMESIS GRAVIDARUM: ICD-10-CM

## 2023-07-27 LAB
ALBUMIN UR-MCNC: 100 MG/DL
ANION GAP SERPL CALCULATED.3IONS-SCNC: 16 MMOL/L (ref 5–18)
APPEARANCE UR: CLEAR
BILIRUB UR QL STRIP: NEGATIVE
BUN SERPL-MCNC: 9 MG/DL (ref 8–22)
CALCIUM SERPL-MCNC: 9.8 MG/DL (ref 8.5–10.5)
CHLORIDE BLD-SCNC: 103 MMOL/L (ref 98–107)
CO2 SERPL-SCNC: 20 MMOL/L (ref 22–31)
COLOR UR AUTO: YELLOW
CREAT SERPL-MCNC: 0.55 MG/DL (ref 0.6–1.1)
ERYTHROCYTE [DISTWIDTH] IN BLOOD BY AUTOMATED COUNT: 13.1 % (ref 10–15)
GFR SERPL CREATININE-BSD FRML MDRD: >90 ML/MIN/1.73M2
GLUCOSE BLD-MCNC: 106 MG/DL (ref 70–125)
GLUCOSE UR STRIP-MCNC: 30 MG/DL
HCT VFR BLD AUTO: 36 % (ref 35–47)
HGB BLD-MCNC: 12.5 G/DL (ref 11.7–15.7)
HGB UR QL STRIP: NEGATIVE
KETONES UR STRIP-MCNC: >150 MG/DL
LEUKOCYTE ESTERASE UR QL STRIP: NEGATIVE
MCH RBC QN AUTO: 28.7 PG (ref 26.5–33)
MCHC RBC AUTO-ENTMCNC: 34.7 G/DL (ref 31.5–36.5)
MCV RBC AUTO: 83 FL (ref 78–100)
MUCOUS THREADS #/AREA URNS LPF: PRESENT /LPF
NITRATE UR QL: NEGATIVE
PH UR STRIP: 6.5 [PH] (ref 5–7)
PLATELET # BLD AUTO: 289 10E3/UL (ref 150–450)
POTASSIUM BLD-SCNC: 3.6 MMOL/L (ref 3.5–5)
RBC # BLD AUTO: 4.35 10E6/UL (ref 3.8–5.2)
RBC URINE: 2 /HPF
SODIUM SERPL-SCNC: 139 MMOL/L (ref 136–145)
SP GR UR STRIP: 1.03 (ref 1–1.03)
SQUAMOUS EPITHELIAL: 2 /HPF
UROBILINOGEN UR STRIP-MCNC: 2 MG/DL
WBC # BLD AUTO: 10.4 10E3/UL (ref 4–11)
WBC URINE: 3 /HPF

## 2023-07-27 PROCEDURE — 96374 THER/PROPH/DIAG INJ IV PUSH: CPT

## 2023-07-27 PROCEDURE — 80048 BASIC METABOLIC PNL TOTAL CA: CPT | Performed by: PHYSICIAN ASSISTANT

## 2023-07-27 PROCEDURE — 85027 COMPLETE CBC AUTOMATED: CPT | Performed by: PHYSICIAN ASSISTANT

## 2023-07-27 PROCEDURE — 250N000011 HC RX IP 250 OP 636: Mod: JZ | Performed by: PHYSICIAN ASSISTANT

## 2023-07-27 PROCEDURE — 258N000003 HC RX IP 258 OP 636: Performed by: PHYSICIAN ASSISTANT

## 2023-07-27 PROCEDURE — 99284 EMERGENCY DEPT VISIT MOD MDM: CPT | Mod: 25

## 2023-07-27 PROCEDURE — 96375 TX/PRO/DX INJ NEW DRUG ADDON: CPT

## 2023-07-27 PROCEDURE — 36415 COLL VENOUS BLD VENIPUNCTURE: CPT | Performed by: PHYSICIAN ASSISTANT

## 2023-07-27 PROCEDURE — 96361 HYDRATE IV INFUSION ADD-ON: CPT

## 2023-07-27 PROCEDURE — 81001 URINALYSIS AUTO W/SCOPE: CPT | Performed by: PHYSICIAN ASSISTANT

## 2023-07-27 RX ORDER — METOCLOPRAMIDE HYDROCHLORIDE 5 MG/ML
10 INJECTION INTRAMUSCULAR; INTRAVENOUS ONCE
Status: COMPLETED | OUTPATIENT
Start: 2023-07-27 | End: 2023-07-27

## 2023-07-27 RX ORDER — ONDANSETRON 2 MG/ML
4 INJECTION INTRAMUSCULAR; INTRAVENOUS ONCE
Status: COMPLETED | OUTPATIENT
Start: 2023-07-27 | End: 2023-07-27

## 2023-07-27 RX ORDER — PYRIDOXINE HCL (VITAMIN B6) 25 MG
25 TABLET ORAL EVERY 8 HOURS PRN
Qty: 30 TABLET | Refills: 0 | Status: SHIPPED | OUTPATIENT
Start: 2023-07-27

## 2023-07-27 RX ADMIN — ONDANSETRON 4 MG: 2 INJECTION INTRAMUSCULAR; INTRAVENOUS at 14:20

## 2023-07-27 RX ADMIN — SODIUM CHLORIDE 1000 ML: 9 INJECTION, SOLUTION INTRAVENOUS at 11:35

## 2023-07-27 RX ADMIN — METOCLOPRAMIDE HYDROCHLORIDE 10 MG: 5 INJECTION INTRAMUSCULAR; INTRAVENOUS at 12:01

## 2023-07-27 RX ADMIN — ONDANSETRON 4 MG: 2 INJECTION INTRAMUSCULAR; INTRAVENOUS at 11:35

## 2023-07-27 RX ADMIN — SODIUM CHLORIDE 1000 ML: 9 INJECTION, SOLUTION INTRAVENOUS at 13:18

## 2023-07-27 ASSESSMENT — ACTIVITIES OF DAILY LIVING (ADL)
ADLS_ACUITY_SCORE: 35
ADLS_ACUITY_SCORE: 35
ADLS_ACUITY_SCORE: 33

## 2023-07-27 NOTE — ED PROVIDER NOTES
EMERGENCY DEPARTMENT ENCOUNTER      NAME: Danielle Johnson  AGE: 23 year old female  YOB: 2000  MRN: [id number]  EVALUATION DATE & TIME: No admission date for patient encounter.    PCP: [facility]    ED PROVIDER: PIETRO Cornell      Chief Complaint   Patient presents with    Emesis During Pregnancy       FINAL IMPRESSION:  1. Hyperemesis gravidarum          ED COURSE & MEDICAL DECISION MAKING:    Pertinent Labs & Imaging studies reviewed. (See chart for details)    23 year old female , currently 12 weeks pregnant presents to the Emergency Department for evaluation of nausea and vomiting. Reports three days of nausea and vomiting without oral intake.     Patient is hemodynamically stable and afebrile on examination. Abdominal examination is unremarkable, soft and non tender to palpation.    Symptoms and physical examination at this time most likely explained by pregnancy and hyperemesis gravidarum.  Also considered gastroenteritis.  I considered cholecystitis, appendicitis, pyelonephritis and doing abdominal imaging such as ultrasound however given benign abdominal exam and normal lab work-up this seems much less likely and I do not feel imaging is necessary today.  Considered UTI but UA is unremarkable.      Patient symptoms improved after Zofran, Reglan and IV fluids however she still was somewhat nauseous.  I placed a phone call to OB/GYN to discuss further management and possible admission however patient requested to go home.  She does not want to wait in the hallway chair for a bed in the hospital and would rather go home as warm baths seem to help her symptoms.  Upon further discussion it does sound like she has been using marijuana for her nausea and to help with sleep.  I discussed the possibility that this may be exacerbating her symptoms and strongly recommended abstaining.  Encouraged her to call her OB/GYN tomorrow and discussed that she had another ER visit and  other additional options. Signs and symptoms that should prompt return to the ER were explained. Patient understood and was comfortable with plan.       ED Course as of 23 1650   Thu 2023   1155 Rechecked patient. Just started fluid bolus. Still nauseous. Ordered IV reglan. Has tolerated well previously   1302 PA student rechecked patient. She is feeling a little better. Still nauseous but no vomiting     At the conclusion of the encounter I discussed the results of all of the tests and the disposition. The questions were answered. The patient or family acknowledged understanding and was agreeable with the care plan.       Medical Decision Making     History:  Supplemental history from: Documented in chart, if applicable and N/A  External Record(s) reviewed: Documented in chart, if applicable. and Outpatient Record:  - Reviewed urgency room records from 23 - seen for hyperemesis as well     MEDICATIONS GIVEN IN THE EMERGENCY:  Medications   0.9% sodium chloride BOLUS (has no administration in time range)   0.9% sodium chloride BOLUS (1,000 mLs Intravenous $New Bag 23 1135)   ondansetron (ZOFRAN) injection 4 mg (4 mg Intravenous $Given 23 1135)   metoclopramide (REGLAN) injection 10 mg (10 mg Intravenous $Given 23 1201)       NEW PRESCRIPTIONS STARTED AT TODAY'S ER VISIT  New Prescriptions    No medications on file        =================================================================    HPI    Patient information was obtained from: Patient    Use of : None    Danielle Johnson is a 23 year old female , currently 12 wk pregnant who presents with nausea and vomiting. The patient reports that she has been unable to eat or drink anything for the past three days without emesis. Symptoms are not alleviated with oral meclizine and zofran. She was seen in the Urgency Room yesterday for similar symptoms. She was discharged home following administration of IV fluids and zofran.  She notes that she has been to the Emergency Department numerous times since her symptoms started one month ago. Patient notes that she had nausea and vomiting with her first pregnancy but her symptoms are worse with this pregnancy. She denies abdominal pain, fevers, chills, and pain with urination. She follows up with outpatient OB provider and last appointment was about one week ago.       REVIEW OF SYSTEMS   See HPI, otherwise all other symptoms reviewed and are negative.    PAST MEDICAL HISTORY:  No past medical history on file.    PAST SURGICAL HISTORY:  No past surgical history on file.      CURRENT MEDICATIONS:    dimenhyDRINATE (DRAMAMINE) 50 MG tablet  fa-pyridoxine-cyancobalamin 2.5-25-2 MG TABS per tablet  metoclopramide (REGLAN) 10 MG tablet  ondansetron (ZOFRAN ODT) 4 MG ODT tab  potassium chloride ER (KLOR-CON M) 20 MEQ CR tablet  Prenatal Vit-Fe Fumarate-FA (PRENATAL MULTIVITAMIN W/IRON) 27-0.8 MG tablet      ALLERGIES:  No Known Allergies    FAMILY HISTORY:  No family history on file.    SOCIAL HISTORY:   Social History     Socioeconomic History    Marital status: Single       VITALS:  /71   Pulse 55   Temp 97.8  F (36.6  C) (Oral)   Resp 16   Wt 74.8 kg (165 lb)   LMP 05/05/2023 (Exact Date)   SpO2 95%   BMI 29.70 kg/m      PHYSICAL EXAM    Constitutional: Well developed, Well nourished, NAD, appears stated age, holding emesis bag  HENT: Normocephalic without obvious deformity, atraumatic. Mucous membranes dry.   Eyes: Conjunctiva clear, lids normal. No discharge.  Respiratory: Normal breath sounds. No distress. Lungs clear to auscultation bilaterally. No wheezes, rhonchi or stridor.  Cardiovascular: Regular rate and rhythm, no murmur. No peripheral edema.  GI: Abdomen is soft and non tender to palpation.   Musculoskeletal: Moving all extremities. No gross deformities.  Integument: Warm, dry, no rashes or lesions.  Neurologic: Alert & oriented x 3. No focal deficits.  Psychiatric:  Normal mood and affect.     LAB:  Results for orders placed or performed during the hospital encounter of 07/27/23   CBC with platelets     Status: Normal   Result Value Ref Range    WBC Count 10.4 4.0 - 11.0 10e3/uL    RBC Count 4.35 3.80 - 5.20 10e6/uL    Hemoglobin 12.5 11.7 - 15.7 g/dL    Hematocrit 36.0 35.0 - 47.0 %    MCV 83 78 - 100 fL    MCH 28.7 26.5 - 33.0 pg    MCHC 34.7 31.5 - 36.5 g/dL    RDW 13.1 10.0 - 15.0 %    Platelet Count 289 150 - 450 10e3/uL   Basic metabolic panel     Status: Abnormal   Result Value Ref Range    Sodium 139 136 - 145 mmol/L    Potassium 3.6 3.5 - 5.0 mmol/L    Chloride 103 98 - 107 mmol/L    Carbon Dioxide (CO2) 20 (L) 22 - 31 mmol/L    Anion Gap 16 5 - 18 mmol/L    Urea Nitrogen 9 8 - 22 mg/dL    Creatinine 0.55 (L) 0.60 - 1.10 mg/dL    Calcium 9.8 8.5 - 10.5 mg/dL    Glucose 106 70 - 125 mg/dL    GFR Estimate >90 >60 mL/min/1.73m2   UA with Microscopic     Status: Abnormal   Result Value Ref Range    Color Urine Yellow Colorless, Straw, Light Yellow, Yellow    Appearance Urine Clear Clear    Glucose Urine 30 (A) Negative mg/dL    Bilirubin Urine Negative Negative    Ketones Urine >150 (A) Negative mg/dL    Specific Gravity Urine 1.033 (H) 1.001 - 1.030    Blood Urine Negative Negative    pH Urine 6.5 5.0 - 7.0    Protein Albumin Urine 100 (A) Negative mg/dL    Urobilinogen Urine 2.0 (A) <2.0 mg/dL    Nitrite Urine Negative Negative    Leukocyte Esterase Urine Negative Negative    Mucus Urine Present (A) None Seen /LPF    RBC Urine 2 <=2 /HPF    WBC Urine 3 <=5 /HPF    Squamous Epithelials Urine 2 (H) <=1 /HPF         RADIOLOGY:  Reviewed all pertinent imaging. Please see official radiology report.  No orders to display        PIETRO Cornell [facility] EMERGENCY ROOM  [address] [facility] [place] [address] [phone]       Tonya Morales PA-C  07/27/23 7417

## 2023-07-27 NOTE — ED TRIAGE NOTES
2nd pregnancy.  Having hyperemesis.  Take Zofran and reglan but not staying down long enough to work     Triage Assessment       Row Name 07/27/23 0910       Triage Assessment (Adult)    Airway WDL WDL       Respiratory WDL    Respiratory WDL WDL       Skin Circulation/Temperature WDL    Skin Circulation/Temperature WDL WDL       Cardiac WDL    Cardiac WDL WDL       Peripheral/Neurovascular WDL    Peripheral Neurovascular WDL WDL       Cognitive/Neuro/Behavioral WDL    Cognitive/Neuro/Behavioral WDL WDL

## 2023-07-27 NOTE — DISCHARGE INSTRUCTIONS
After further discussion with you, we are discharging you home.  I strongly recommend contacting your OB/GYN tomorrow and letting them know that you were seen in the ER again and asking for a sooner follow-up visit.  There are outpatient management options for hyperemesis that you can discuss with them further.    We are starting you on vitamin B6 to hopefully help with some of your nausea.  Additionally would strongly recommend abstaining from marijuana as this can worsen these symptoms as well.    Return to the ER if you have worsening symptoms, pain, bleeding.

## 2023-07-28 ENCOUNTER — HOSPITAL ENCOUNTER (EMERGENCY)
Facility: CLINIC | Age: 23
Discharge: HOME | End: 2023-07-28
Attending: EMERGENCY MEDICINE | Admitting: EMERGENCY MEDICINE
Payer: COMMERCIAL

## 2023-07-28 ENCOUNTER — HOSPITAL ENCOUNTER (EMERGENCY)
Facility: CLINIC | Age: 23
End: 2023-07-28

## 2023-07-28 VITALS
HEART RATE: 68 BPM | DIASTOLIC BLOOD PRESSURE: 64 MMHG | OXYGEN SATURATION: 99 % | HEIGHT: 62 IN | WEIGHT: 165 LBS | RESPIRATION RATE: 17 BRPM | BODY MASS INDEX: 30.36 KG/M2 | SYSTOLIC BLOOD PRESSURE: 140 MMHG

## 2023-07-28 DIAGNOSIS — O21.0 HYPEREMESIS GRAVIDARUM: ICD-10-CM

## 2023-07-28 LAB
ALBUMIN SERPL-MCNC: 3.9 G/DL (ref 3.5–5)
ALP SERPL-CCNC: 41 U/L (ref 45–120)
ALT SERPL W P-5'-P-CCNC: <9 U/L (ref 0–45)
ANION GAP SERPL CALCULATED.3IONS-SCNC: 11 MMOL/L (ref 5–18)
AST SERPL W P-5'-P-CCNC: 12 U/L (ref 0–40)
BILIRUB SERPL-MCNC: 0.8 MG/DL (ref 0–1)
BUN SERPL-MCNC: 5 MG/DL (ref 8–22)
CALCIUM SERPL-MCNC: 9.4 MG/DL (ref 8.5–10.5)
CHLORIDE BLD-SCNC: 103 MMOL/L (ref 98–107)
CO2 SERPL-SCNC: 19 MMOL/L (ref 22–31)
CREAT SERPL-MCNC: 0.53 MG/DL (ref 0.6–1.1)
GFR SERPL CREATININE-BSD FRML MDRD: >90 ML/MIN/1.73M2
GLUCOSE BLD-MCNC: 105 MG/DL (ref 70–125)
LIPASE SERPL-CCNC: 10 U/L (ref 0–52)
MAGNESIUM SERPL-MCNC: 1.9 MG/DL (ref 1.8–2.6)
POTASSIUM BLD-SCNC: 3.2 MMOL/L (ref 3.5–5)
PROT SERPL-MCNC: 7.1 G/DL (ref 6–8)
SODIUM SERPL-SCNC: 133 MMOL/L (ref 136–145)

## 2023-07-28 PROCEDURE — 258N000003 HC RX IP 258 OP 636: Performed by: EMERGENCY MEDICINE

## 2023-07-28 PROCEDURE — 99284 EMERGENCY DEPT VISIT MOD MDM: CPT | Mod: 25

## 2023-07-28 PROCEDURE — 96374 THER/PROPH/DIAG INJ IV PUSH: CPT

## 2023-07-28 PROCEDURE — 36415 COLL VENOUS BLD VENIPUNCTURE: CPT | Performed by: EMERGENCY MEDICINE

## 2023-07-28 PROCEDURE — 96375 TX/PRO/DX INJ NEW DRUG ADDON: CPT

## 2023-07-28 PROCEDURE — 250N000011 HC RX IP 250 OP 636: Mod: JZ | Performed by: EMERGENCY MEDICINE

## 2023-07-28 PROCEDURE — 83690 ASSAY OF LIPASE: CPT | Performed by: EMERGENCY MEDICINE

## 2023-07-28 PROCEDURE — 80053 COMPREHEN METABOLIC PANEL: CPT | Performed by: EMERGENCY MEDICINE

## 2023-07-28 PROCEDURE — 83735 ASSAY OF MAGNESIUM: CPT | Performed by: EMERGENCY MEDICINE

## 2023-07-28 PROCEDURE — 96361 HYDRATE IV INFUSION ADD-ON: CPT

## 2023-07-28 RX ORDER — ONDANSETRON 2 MG/ML
4 INJECTION INTRAMUSCULAR; INTRAVENOUS ONCE
Status: COMPLETED | OUTPATIENT
Start: 2023-07-28 | End: 2023-07-28

## 2023-07-28 RX ADMIN — SODIUM CHLORIDE, POTASSIUM CHLORIDE, SODIUM LACTATE AND CALCIUM CHLORIDE 1000 ML: 600; 310; 30; 20 INJECTION, SOLUTION INTRAVENOUS at 08:05

## 2023-07-28 RX ADMIN — FAMOTIDINE 20 MG: 10 INJECTION, SOLUTION INTRAVENOUS at 08:12

## 2023-07-28 RX ADMIN — DEXTROSE AND SODIUM CHLORIDE 1000 ML: 5; 900 INJECTION, SOLUTION INTRAVENOUS at 09:20

## 2023-07-28 RX ADMIN — PROCHLORPERAZINE EDISYLATE 10 MG: 5 INJECTION INTRAMUSCULAR; INTRAVENOUS at 08:06

## 2023-07-28 RX ADMIN — ONDANSETRON 4 MG: 2 INJECTION INTRAMUSCULAR; INTRAVENOUS at 10:44

## 2023-07-28 ASSESSMENT — ACTIVITIES OF DAILY LIVING (ADL)
ADLS_ACUITY_SCORE: 35
ADLS_ACUITY_SCORE: 35

## 2023-07-28 ASSESSMENT — ENCOUNTER SYMPTOMS
NAUSEA: 1
ABDOMINAL PAIN: 1
SHORTNESS OF BREATH: 0
HEADACHES: 0
FEVER: 0
VOMITING: 1
DIARRHEA: 0

## 2023-07-28 NOTE — ED PROVIDER NOTES
EMERGENCY DEPARTMENT ENCOUNTER      NAME: Danielle Johnson  AGE: 23 year old female  YOB: 2000  MRN: [id number]  EVALUATION DATE & TIME: 7/28/2023  7:27 AM    PCP: [facility]    ED PROVIDER: Rose Mayfield DO      Chief Complaint   Patient presents with    Emesis During Pregnancy         FINAL IMPRESSION:  1. Hyperemesis gravidarum          ED COURSE & MEDICAL DECISION MAKING:    Pertinent Labs & Imaging studies reviewed. (See chart for details)  7:31 AM Medical student met the patient for my initial interview and exam.  8:47 AM I rechecked the patient.    10:55 AM Patient rechecked. She feels better and requests discharge. She states she will contact OB instead of us consulting OB in ED.     23 year old female presents to the Emergency Department for evaluation of Patient presenting for evaluation of ongoing nausea vomiting in pregnancy.  Patient has had multiple visits since her pregnancy.  She is around 12 weeks gestation.  She is an ultrasound that confirmed IUP that was unremarkable.  She also her OB 10 days ago.  She is been prescribed several medications including B6, pyridoxine, ondansetron, metoclopramide.  Notes ongoing nausea and vomiting.  Of note, patient also uses marijuana.  Warm baths have helped her.  Likely a combination of hyperemesis gravidarum and cannabinoid hyperemesis.  Patient is ill but nontoxic-appearing here today.  No abdominal pain.  No fever.  She has mild epigastric tenderness which is likely related to some gastritis.  We will recheck some LFTs and lipase.  She does not have a surgical abdomen.  No dysuria.  No other concerning vaginal discharge.  CBC drawn yesterday and I do not see utility in repeat.  Labs do show mild decrease in her CO2, potassium 3.2.  Electrolytes with mild hyponatremia but otherwise unremarkable.  Normal kidney function.  Magnesium unremarkable.  Given a liter of LR, D5 NS, Compazine.  No further vomiting here.  I did page  out to her OB group.  They were paged more than once and hadn't called back.  Patient requests discharge in the mean time.  She feels comfortable reaching out to OB on her own.  I do not think she requires admission but may be helpful for her OB to set up some infusions for her to help keep her out of the ER. She has several antiemetics at home.  Encouraged her to continue.  I also recommended pepcid and TUMs as likely has an element of gastritis.  Encouraged marijuana cessation.  Return if any acute worsening of symptoms, syncope, fever, significant abdominal pain or any other concerns.    At the conclusion of the encounter I discussed the results of all of the tests and the disposition. The questions were answered. The patient or family acknowledged understanding and was agreeable with the care plan.     Medical Decision Making    History:  Supplemental history from: Documented in chart, if applicable  External Record(s) reviewed: Documented in chart, if applicable.    Work Up:  Chart documentation includes differential considered and any EKGs or imaging independently interpreted by provider, where specified.  In additional to work up documented, I considered the following work up: Documented in chart, if applicable.    External consultation:  Discussion of management with another provider: Documented in chart, if applicable    Complicating factors:  Care impacted by chronic illness: N/A  Care affected by social determinants of health: N/A    Disposition considerations: Discharge. I recommended the patient continue their current prescription strength medication(s): antiemetics. See documentation for any additional details.      MEDICATIONS GIVEN IN THE EMERGENCY:  Medications   prochlorperazine (COMPAZINE) injection 10 mg (10 mg Intravenous $Given 7/28/23 0806)   lactated ringers BOLUS 1,000 mL (0 mLs Intravenous Stopped 7/28/23 0920)   famotidine (PEPCID) injection 20 mg (20 mg Intravenous $Given 7/28/23 0812)    dextrose 5% and 0.9% NaCl BOLUS (0 mLs Intravenous Stopped 23 1022)   ondansetron (ZOFRAN) injection 4 mg (4 mg Intravenous $Given 23 1044)       NEW PRESCRIPTIONS STARTED AT TODAY'S ER VISIT  Discharge Medication List as of 2023 11:00 AM             =================================================================    HPI    Patient information was obtained from: patient     Use of : N/A          Danielle Johnson is a 23 year old female  curently 12 weeks pregnant with a pertinent history of hyperemesis gravidarum, marijuana use, who presents to this ED by private car for evaluation of nausea, vomiting.    Per chart review, patient has been seen in ED 6x within the past month for evaluation of nausea and vomiting in pregnancy. During most recent ED visit on 23 (1 day ago), labs including UA, CBC, BMP unremarkable. Symptoms improved with Zofran, Reglan, and IV fluids. OB/GYN consulted regarding further management and possible admission, however, patient requested to go home. Discharged with prescription for Vitamin B6 25mg PRN.     Patient presents with persistent nausea and vomiting following ED visit yesterday. Vomiting is provoked by any oral intake, and she hasn't been able to keep down any food/fluids since yesterday. Endorses associated diffuse upper abdominal pain. Current symptoms are similar to past ED visits this month during pregnancy. She states anti-nausea mediations helped her symptoms earlier this month, but now only provide temporary relief. She left a message with OB yesterday but hasn't received call back. History of hyperemesis gravidarum during her first pregnancy which responded to medications. No pregnancy complications otherwise. No history of GERD. Symptoms are not provoked by laying flat or eating spicy foods. Otherwise healthy. No daily medications. She is still using marijuana. Denies fever, chest pain, shortness of breath, vaginal bleeding, leg swelling,  "diarrhea, headache, vision changes.       REVIEW OF SYSTEMS   Review of Systems   Constitutional:  Negative for fever.   Eyes:  Negative for visual disturbance.   Respiratory:  Negative for shortness of breath.    Cardiovascular:  Negative for chest pain and leg swelling.   Gastrointestinal:  Positive for abdominal pain, nausea and vomiting. Negative for diarrhea.   Genitourinary:  Negative for vaginal bleeding.   Neurological:  Negative for headaches.   All other systems reviewed and are negative.       PAST MEDICAL HISTORY:  No past medical history on file.    PAST SURGICAL HISTORY:  No past surgical history on file.        CURRENT MEDICATIONS:    dimenhyDRINATE (DRAMAMINE) 50 MG tablet  fa-pyridoxine-cyancobalamin 2.5-25-2 MG TABS per tablet  metoclopramide (REGLAN) 10 MG tablet  ondansetron (ZOFRAN ODT) 4 MG ODT tab  potassium chloride ER (KLOR-CON M) 20 MEQ CR tablet  Prenatal Vit-Fe Fumarate-FA (PRENATAL MULTIVITAMIN W/IRON) 27-0.8 MG tablet  pyridOXINE (VITAMIN B6) 25 MG tablet         ALLERGIES:  No Known Allergies    FAMILY HISTORY:  No family history on file.    SOCIAL HISTORY:   Social History     Socioeconomic History    Marital status: Single       VITALS:  BP (!) 140/64   Pulse 68   Resp 17   Ht 1.575 m (5' 2\")   Wt 74.8 kg (165 lb)   LMP 05/05/2023 (Exact Date)   SpO2 99%   BMI 30.18 kg/m      PHYSICAL EXAM    Physical Exam  Constitutional:       General: She is not in acute distress.     Appearance: She is ill-appearing. She is not toxic-appearing.   HENT:      Head: Normocephalic and atraumatic.      Mouth/Throat:      Pharynx: Oropharynx is clear.   Eyes:      Pupils: Pupils are equal, round, and reactive to light.   Cardiovascular:      Rate and Rhythm: Normal rate and regular rhythm.      Pulses: Normal pulses.      Heart sounds: Normal heart sounds.   Pulmonary:      Effort: Pulmonary effort is normal.      Breath sounds: Normal breath sounds.   Abdominal:      General: Abdomen is flat. " Bowel sounds are normal.      Palpations: Abdomen is soft.      Tenderness: There is no abdominal tenderness.   Musculoskeletal:         General: Normal range of motion.   Skin:     General: Skin is warm and dry.      Capillary Refill: Capillary refill takes less than 2 seconds.   Neurological:      General: No focal deficit present.      Mental Status: She is alert and oriented to person, place, and time.           LAB:  All pertinent labs reviewed and interpreted.  Labs Ordered and Resulted from Time of ED Arrival to Time of ED Departure   COMPREHENSIVE METABOLIC PANEL - Abnormal       Result Value    Sodium 133 (*)     Potassium 3.2 (*)     Chloride 103      Carbon Dioxide (CO2) 19 (*)     Anion Gap 11      Urea Nitrogen 5 (*)     Creatinine 0.53 (*)     Calcium 9.4      Glucose 105      Alkaline Phosphatase 41 (*)     AST 12      ALT <9      Protein Total 7.1      Albumin 3.9      Bilirubin Total 0.8      GFR Estimate >90     LIPASE - Normal    Lipase 10     MAGNESIUM - Normal    Magnesium 1.9           I, Marjorie Campa , am serving as a scribe to document services personally performed by Dr. Rose Mayfield based on my observation and the provider's statements to me. IRose DO attest that Marjorie Campa is acting in a scribe capacity, has observed my performance of the services and has documented them in accordance with my direction.    Rose Mayfield DO  Emergency Medicine  [facility] EMERGENCY ROOM  [address] [id number] [phone]  Dept: [phone]       Rose Mayfield DO  07/28/23 9064

## 2023-07-28 NOTE — ED TRIAGE NOTES
The patient presents to the ED with continued nausea and vomiting since being evaluated in ED yesterday for same. Patient is 12 weeks pregnant. Has not been able to tolerate PO intake. Home medications are no longer working.     Triage Assessment       Row Name 07/28/23 0730       Triage Assessment (Adult)    Airway WDL WDL       Respiratory WDL    Respiratory WDL WDL       Skin Circulation/Temperature WDL    Skin Circulation/Temperature WDL WDL       Cardiac WDL    Cardiac WDL WDL       Peripheral/Neurovascular WDL    Peripheral Neurovascular WDL WDL       Cognitive/Neuro/Behavioral WDL    Cognitive/Neuro/Behavioral WDL WDL

## 2023-07-28 NOTE — DISCHARGE INSTRUCTIONS
As discussed, avoid any marijuana as this make your symptoms worse  Continue home nausea medication including vitamin B6, paroxetine, Zofran and/or metoclopramide  I would add twice daily Pepcid and Tums as you likely have some gastritis  Follow-up closely with your OB provider.  Please call them again today.  They may be able to set up transfusions for you to keep you out of the emergency room  Return if unable to keep down anything, significant pain, fever or any other concerns